# Patient Record
Sex: FEMALE | Race: WHITE | NOT HISPANIC OR LATINO | Employment: STUDENT | URBAN - METROPOLITAN AREA
[De-identification: names, ages, dates, MRNs, and addresses within clinical notes are randomized per-mention and may not be internally consistent; named-entity substitution may affect disease eponyms.]

---

## 2017-01-24 ENCOUNTER — OFFICE VISIT (OUTPATIENT)
Dept: PEDIATRICS | Facility: CLINIC | Age: 22
End: 2017-01-24
Payer: MEDICARE

## 2017-01-24 ENCOUNTER — TELEPHONE (OUTPATIENT)
Dept: PEDIATRICS | Facility: CLINIC | Age: 22
End: 2017-01-24

## 2017-01-24 ENCOUNTER — LAB VISIT (OUTPATIENT)
Dept: LAB | Facility: HOSPITAL | Age: 22
End: 2017-01-24
Attending: PEDIATRICS
Payer: MEDICARE

## 2017-01-24 VITALS
WEIGHT: 129.63 LBS | HEART RATE: 118 BPM | SYSTOLIC BLOOD PRESSURE: 109 MMHG | TEMPERATURE: 99 F | BODY MASS INDEX: 19.83 KG/M2 | DIASTOLIC BLOOD PRESSURE: 81 MMHG | RESPIRATION RATE: 18 BRPM

## 2017-01-24 DIAGNOSIS — R59.0 LYMPHADENOPATHY OF RIGHT CERVICAL REGION: Primary | ICD-10-CM

## 2017-01-24 DIAGNOSIS — R22.0 FACIAL SWELLING: ICD-10-CM

## 2017-01-24 DIAGNOSIS — R59.0 LYMPHADENOPATHY OF RIGHT CERVICAL REGION: ICD-10-CM

## 2017-01-24 LAB
ALBUMIN SERPL BCP-MCNC: 3.7 G/DL
ALP SERPL-CCNC: 159 U/L
ALT SERPL W/O P-5'-P-CCNC: 388 U/L
ANION GAP SERPL CALC-SCNC: 10 MMOL/L
AST SERPL-CCNC: 534 U/L
BASOPHILS # BLD AUTO: 0.09 K/UL
BASOPHILS NFR BLD: 1.8 %
BILIRUB SERPL-MCNC: 1 MG/DL
BUN SERPL-MCNC: 8 MG/DL
CALCIUM SERPL-MCNC: 8.7 MG/DL
CHLORIDE SERPL-SCNC: 99 MMOL/L
CO2 SERPL-SCNC: 26 MMOL/L
CREAT SERPL-MCNC: 0.8 MG/DL
CRP SERPL-MCNC: 19.5 MG/L
DIFFERENTIAL METHOD: ABNORMAL
EOSINOPHIL # BLD AUTO: 0 K/UL
EOSINOPHIL NFR BLD: 0 %
ERYTHROCYTE [DISTWIDTH] IN BLOOD BY AUTOMATED COUNT: 14.3 %
EST. GFR  (AFRICAN AMERICAN): >60 ML/MIN/1.73 M^2
EST. GFR  (NON AFRICAN AMERICAN): >60 ML/MIN/1.73 M^2
GLUCOSE SERPL-MCNC: 93 MG/DL
HCT VFR BLD AUTO: 41.2 %
HETEROPH AB SERPL QL IA: NEGATIVE
HGB BLD-MCNC: 13.5 G/DL
LYMPHOCYTES # BLD AUTO: 2.5 K/UL
LYMPHOCYTES NFR BLD: 50 %
MCH RBC QN AUTO: 29.3 PG
MCHC RBC AUTO-ENTMCNC: 32.8 %
MCV RBC AUTO: 89 FL
MONOCYTES # BLD AUTO: 0.4 K/UL
MONOCYTES NFR BLD: 8.2 %
NEUTROPHILS # BLD AUTO: 2 K/UL
NEUTROPHILS NFR BLD: 39.8 %
PLATELET # BLD AUTO: 187 K/UL
PMV BLD AUTO: 11.1 FL
POTASSIUM SERPL-SCNC: 3.6 MMOL/L
PROT SERPL-MCNC: 7.3 G/DL
RBC # BLD AUTO: 4.61 M/UL
SODIUM SERPL-SCNC: 135 MMOL/L
WBC # BLD AUTO: 5.02 K/UL

## 2017-01-24 PROCEDURE — 99999 PR PBB SHADOW E&M-EST. PATIENT-LVL III: CPT | Mod: PBBFAC,,, | Performed by: PEDIATRICS

## 2017-01-24 PROCEDURE — 86308 HETEROPHILE ANTIBODY SCREEN: CPT

## 2017-01-24 PROCEDURE — 86665 EPSTEIN-BARR CAPSID VCA: CPT

## 2017-01-24 PROCEDURE — 86580 TB INTRADERMAL TEST: CPT | Mod: S$GLB,,, | Performed by: PEDIATRICS

## 2017-01-24 PROCEDURE — 36415 COLL VENOUS BLD VENIPUNCTURE: CPT | Mod: PO

## 2017-01-24 PROCEDURE — 85025 COMPLETE CBC W/AUTO DIFF WBC: CPT

## 2017-01-24 PROCEDURE — 96372 THER/PROPH/DIAG INJ SC/IM: CPT | Mod: 59,S$GLB,, | Performed by: PEDIATRICS

## 2017-01-24 PROCEDURE — 99214 OFFICE O/P EST MOD 30 MIN: CPT | Mod: 25,S$GLB,, | Performed by: PEDIATRICS

## 2017-01-24 PROCEDURE — 80053 COMPREHEN METABOLIC PANEL: CPT

## 2017-01-24 PROCEDURE — 86140 C-REACTIVE PROTEIN: CPT

## 2017-01-24 PROCEDURE — 1159F MED LIST DOCD IN RCRD: CPT | Mod: S$GLB,,, | Performed by: PEDIATRICS

## 2017-01-24 RX ORDER — DEXAMETHASONE SODIUM PHOSPHATE 4 MG/ML
8 INJECTION, SOLUTION INTRA-ARTICULAR; INTRALESIONAL; INTRAMUSCULAR; INTRAVENOUS; SOFT TISSUE
Status: COMPLETED | OUTPATIENT
Start: 2017-01-24 | End: 2017-01-24

## 2017-01-24 RX ADMIN — DEXAMETHASONE SODIUM PHOSPHATE 8 MG: 4 INJECTION, SOLUTION INTRA-ARTICULAR; INTRALESIONAL; INTRAMUSCULAR; INTRAVENOUS; SOFT TISSUE at 03:01

## 2017-01-24 NOTE — TELEPHONE ENCOUNTER
Returned call. Spoke with mom. Mom stating that patient was seen by ENT last week. Started on Clindamycin and a steroid. Mom stating that patient has worsened. Stating that patient has facial swelling/ sore throat/ hoarseness/ swollen gland in neck. Stating that patient needs to be seen today. Appointment scheduled today with Dr Valencia.

## 2017-01-24 NOTE — TELEPHONE ENCOUNTER
----- Message from Jose Hernandez sent at 1/24/2017 10:16 AM CST -----  Contact: Patient  Patient's mom called Samia requesting same day appointment. Please call back at [phone]. Thanks,

## 2017-01-24 NOTE — PROGRESS NOTES
History of Present Illness     Patient Identification  Dianna Gee is a 21 y.o. female.    Patient information was obtained from patient.  History/Exam limitations: none.  Chief Complaint   Face swelling, enlarged lymph node right side, fever 99.2 here, sweating since Wednesday x 6 days ago.     The patient complains of headache, facial swelling, right sided neck pain.  Roommate cooked salmon and thought allergic reaction.  ENT office, given antibiotics orally and oral steroid.  No testing for strep.  Clindamycin orally and steroid. Onset of symptoms was gradual starting 6 days ago and has been unchanged since that time except right lymph node is a little smaller. The patient had been seen by another provider for this illness. Other symptoms include headache.  No vomiting, nausea. The patient had not had a known Strep exposure. The patient has not had a known Mono exposure.  No cat or kitten exposure.   No past medical history on file.  No family history on file.  Current Outpatient Prescriptions   Medication Sig Dispense Refill    DEXTROMETHORPHAN HBR (DELSYM ORAL) Take by mouth.      GUAIFENESIN (MUCINEX ORAL) Take by mouth.      LACTOBACILLUS ACIDOPHILUS (PROBIOTIC ORAL) Take by mouth.       Current Facility-Administered Medications   Medication Dose Route Frequency Provider Last Rate Last Dose    dexamethasone injection 8 mg  8 mg Intramuscular 1 time in Clinic/HOD Joselin Valencia MD         Review of patient's allergies indicates:   Allergen Reactions    Fish containing products     Rondec [brompheniramine-pseudoephedrin]      Problems with gait--dizzy spells--drowsy     Social History     Social History    Marital status: Single     Spouse name: N/A    Number of children: N/A    Years of education: N/A     Occupational History    Not on file.     Social History Main Topics    Smoking status: Never Smoker    Smokeless tobacco: Not on file    Alcohol use Not on file    Drug use: Not on  file    Sexual activity: Not on file     Other Topics Concern    Not on file     Social History Narrative    No narrative on file     Review of Systems  Pertinent items are noted in HPI.     Physical Exam     There were no vitals taken for this visit.    Visit Vitals    /81    Pulse (!) 118    Temp 99.2 °F (37.3 °C) (Oral)    Resp 18    Wt 58.8 kg (129 lb 10.1 oz)    LMP 01/13/2017 (Exact Date)    BMI 19.83 kg/m2       General Appearance:    Alert, cooperative, no distress, appears stated age   Head:    Normocephalic, without obvious abnormality, atraumatic   Eyes:    PERRL, conjunctiva/corneas clear, EOM's intact, fundi     benign, both eyes   Ears:    Normal TM's and external ear canals, both ears   Nose:   Nares normal, septum midline, mucosa normal, no drainage    or sinus tenderness       Neck:   Supple, symmetrical, right anterior cervical lymphadenopathy large noted, nonflluctuant firm, tender, mobile.          Lungs:     Clear to auscultation bilaterally, respirations unlabored        Heart:    Regular rate and rhythm, S1 and S2 normal, no murmur, rub   or gallop       Abdomen:     Soft, non-tender, bowel sounds active all four quadrants,     no masses, no organomegaly           Extremities:   Extremities normal, atraumatic, no cyanosis or edema   Pulses:   2+ and symmetric all extremities   Skin:   Skin color, texture, turgor normal, no rashes or lesions   Lymph nodes:   Cervical, supraclavicular, and axillary nodes normal   Neurologic:   CNII-XII intact, normal strength, sensation and reflexes     throughout       Clindamycin 150 mg tid   Prednisone oral steroid    IMPRESSION;    RIGHT CERVICAL LYPHADENOPATHY  MALAISE/FATIGUE  FEVER/CHILLS  FACIAL SWELLING    PLAN:  BLOODWORK TODAY.    IM dexamethasone given 8 mg   Continue clindamycin as directed.  Will contact outpatient with results of bloodwork (mono, liver function, tuberculin skin test, CBC, adenovirus stool).

## 2017-01-25 DIAGNOSIS — R53.83 FATIGUE, UNSPECIFIED TYPE: ICD-10-CM

## 2017-01-25 DIAGNOSIS — R59.0 CERVICAL LYMPHADENOPATHY: Primary | ICD-10-CM

## 2017-01-27 ENCOUNTER — CLINICAL SUPPORT (OUTPATIENT)
Dept: PEDIATRICS | Facility: CLINIC | Age: 22
End: 2017-01-27
Payer: COMMERCIAL

## 2017-01-27 ENCOUNTER — LAB VISIT (OUTPATIENT)
Dept: LAB | Facility: HOSPITAL | Age: 22
End: 2017-01-27
Attending: PEDIATRICS
Payer: COMMERCIAL

## 2017-01-27 DIAGNOSIS — R53.83 FATIGUE, UNSPECIFIED TYPE: ICD-10-CM

## 2017-01-27 DIAGNOSIS — R59.0 CERVICAL LYMPHADENOPATHY: ICD-10-CM

## 2017-01-27 PROCEDURE — 87301 ADENOVIRUS AG IA: CPT

## 2017-01-30 ENCOUNTER — TELEPHONE (OUTPATIENT)
Dept: PEDIATRICS | Facility: CLINIC | Age: 22
End: 2017-01-30

## 2017-01-30 ENCOUNTER — LAB VISIT (OUTPATIENT)
Dept: LAB | Facility: HOSPITAL | Age: 22
End: 2017-01-30
Attending: PEDIATRICS
Payer: MEDICARE

## 2017-01-30 DIAGNOSIS — R59.0 CERVICAL LYMPHADENOPATHY: Primary | ICD-10-CM

## 2017-01-30 DIAGNOSIS — R59.0 CERVICAL LYMPHADENOPATHY: ICD-10-CM

## 2017-01-30 LAB
ALBUMIN SERPL BCP-MCNC: 3.5 G/DL
ALP SERPL-CCNC: 284 U/L
ALT SERPL W/O P-5'-P-CCNC: 283 U/L
ANION GAP SERPL CALC-SCNC: 13 MMOL/L
AST SERPL-CCNC: 146 U/L
BASOPHILS # BLD AUTO: ABNORMAL K/UL
BASOPHILS NFR BLD: 0 %
BILIRUB SERPL-MCNC: 0.8 MG/DL
BUN SERPL-MCNC: 8 MG/DL
CALCIUM SERPL-MCNC: 9.2 MG/DL
CHLORIDE SERPL-SCNC: 99 MMOL/L
CO2 SERPL-SCNC: 29 MMOL/L
CREAT SERPL-MCNC: 0.7 MG/DL
DIFFERENTIAL METHOD: ABNORMAL
EBV EA IGG SER QL IF: ABNORMAL TITER
EBV NA IGG SER IA-ACNC: ABNORMAL TITER
EBV VCA IGG SER IA-ACNC: ABNORMAL TITER
EBV VCA IGM SER IA-ACNC: ABNORMAL TITER
EOSINOPHIL # BLD AUTO: ABNORMAL K/UL
EOSINOPHIL NFR BLD: 0 %
ERYTHROCYTE [DISTWIDTH] IN BLOOD BY AUTOMATED COUNT: 14.9 %
EST. GFR  (AFRICAN AMERICAN): >60 ML/MIN/1.73 M^2
EST. GFR  (NON AFRICAN AMERICAN): >60 ML/MIN/1.73 M^2
GLUCOSE SERPL-MCNC: 108 MG/DL
HCT VFR BLD AUTO: 39 %
HGB BLD-MCNC: 13.3 G/DL
LYMPHOCYTES # BLD AUTO: ABNORMAL K/UL
LYMPHOCYTES NFR BLD: 58 %
MCH RBC QN AUTO: 29.2 PG
MCHC RBC AUTO-ENTMCNC: 34.1 %
MCV RBC AUTO: 86 FL
MONOCYTES # BLD AUTO: ABNORMAL K/UL
MONOCYTES NFR BLD: 5 %
NEUTROPHILS NFR BLD: 34 %
NEUTS BAND NFR BLD MANUAL: 3 %
PLATELET # BLD AUTO: 246 K/UL
PLATELET BLD QL SMEAR: ABNORMAL
PMV BLD AUTO: 10.4 FL
POTASSIUM SERPL-SCNC: 3.9 MMOL/L
PROT SERPL-MCNC: 7.3 G/DL
RBC # BLD AUTO: 4.56 M/UL
SODIUM SERPL-SCNC: 141 MMOL/L
WBC # BLD AUTO: 8.51 K/UL

## 2017-01-30 PROCEDURE — 85007 BL SMEAR W/DIFF WBC COUNT: CPT | Mod: PO

## 2017-01-30 PROCEDURE — 36415 COLL VENOUS BLD VENIPUNCTURE: CPT | Mod: PO

## 2017-01-30 PROCEDURE — 80053 COMPREHEN METABOLIC PANEL: CPT | Mod: PO

## 2017-01-30 PROCEDURE — 85027 COMPLETE CBC AUTOMATED: CPT | Mod: PO

## 2017-01-30 NOTE — TELEPHONE ENCOUNTER
Patient was in the office on 1/24/17 seen by Dr Valencia. Mom is asking for lab results. Please advise.

## 2017-01-30 NOTE — TELEPHONE ENCOUNTER
Looks like probable mono, will recheck liver enzymes and follow up next week.  Discussed with mom, PLEASE PUT ON MY SCHEDULE FEB 7 AT 2PM.

## 2017-01-30 NOTE — TELEPHONE ENCOUNTER
----- Message from Katie Crane sent at 1/30/2017  8:20 AM CST -----  Contact: Samia Hamlin called regarding lab results from last week. Please contact [phone] (spwy)

## 2017-01-30 NOTE — TELEPHONE ENCOUNTER
----- Message from Kenia Suggs MD sent at 1/30/2017 12:48 PM CST -----  Please call with mono titers +- no contact sports or any activity that can injure her abdomen due to increase risk of spleen getting big with this virus- please let me know if she has questions.

## 2017-02-01 LAB — HADV AG STL QL IA: NOT DETECTED

## 2017-02-07 ENCOUNTER — OFFICE VISIT (OUTPATIENT)
Dept: PEDIATRICS | Facility: CLINIC | Age: 22
End: 2017-02-07
Payer: COMMERCIAL

## 2017-02-07 VITALS
SYSTOLIC BLOOD PRESSURE: 118 MMHG | RESPIRATION RATE: 18 BRPM | BODY MASS INDEX: 18.32 KG/M2 | DIASTOLIC BLOOD PRESSURE: 78 MMHG | WEIGHT: 123.69 LBS | TEMPERATURE: 98 F | HEIGHT: 69 IN | HEART RATE: 100 BPM

## 2017-02-07 DIAGNOSIS — B27.90 MONONUCLEOSIS SYNDROME: Primary | ICD-10-CM

## 2017-02-07 PROCEDURE — 99999 PR PBB SHADOW E&M-EST. PATIENT-LVL III: CPT | Mod: PBBFAC,,, | Performed by: PEDIATRICS

## 2017-02-07 PROCEDURE — 99213 OFFICE O/P EST LOW 20 MIN: CPT | Mod: S$GLB,,, | Performed by: PEDIATRICS

## 2017-02-07 RX ORDER — FLUOCINONIDE 0.5 MG/G
OINTMENT TOPICAL
Refills: 1 | COMMUNITY
Start: 2016-11-16

## 2017-02-07 RX ORDER — CLINDAMYCIN HYDROCHLORIDE 150 MG/1
CAPSULE ORAL
Refills: 0 | COMMUNITY
Start: 2017-01-20 | End: 2017-02-07

## 2017-02-07 RX ORDER — DIPHENHYDRAMINE HCL 25 MG
50 TABLET ORAL
COMMUNITY
End: 2017-02-07

## 2017-02-07 RX ORDER — METHYLPREDNISOLONE 4 MG/1
TABLET ORAL
Refills: 0 | COMMUNITY
Start: 2017-01-20 | End: 2017-02-07

## 2017-02-07 NOTE — PROGRESS NOTES
Subjective:      History was provided by the mother and patient was brought in for Follow-up (f/u from last MD visit for mono)  .    History of Present Illness:  HPI  Pt here for follow up of mononucleosis syndrome with mildly positive igg and igm.  Pt also seemed to have significant swelling with antibiotic. Improved with stopping antibiotic and then recurred after restarting.  No fevers.  Still with mild neck tenderness bilaterally and some mild fatigue with walking long distances.  No sob with activity otherwise.  Reviewed labs from last 2 previous visits with patient.  She does have occasional  Cough, 1-2 times per day, seems to be related to some post nasal drip.      Review of Systems   Constitutional: Negative for activity change, appetite change, chills, fatigue and fever.   HENT: Negative for congestion, ear discharge, ear pain, nosebleeds, postnasal drip, rhinorrhea, sinus pressure, sneezing and sore throat.    Eyes: Negative for pain, discharge, redness and itching.   Respiratory: Positive for cough. Negative for shortness of breath and wheezing.    Cardiovascular: Negative for chest pain and palpitations.   Gastrointestinal: Negative for abdominal pain, constipation, diarrhea and vomiting.   Genitourinary: Negative for dysuria, enuresis, hematuria and urgency.   Musculoskeletal: Negative for neck stiffness.   Skin: Negative for rash.   Neurological: Negative for syncope and headaches.       Objective:     Physical Exam   Constitutional: She is oriented to person, place, and time. She appears well-developed and well-nourished.   HENT:   Head: Normocephalic and atraumatic.   Right Ear: Tympanic membrane, external ear and ear canal normal.   Left Ear: Tympanic membrane, external ear and ear canal normal.   Mild nasal discharge   Eyes: Conjunctivae are normal. Pupils are equal, round, and reactive to light.   Neck: Normal range of motion. Neck supple.   Cardiovascular: Normal rate, regular rhythm and normal  heart sounds.    No murmur heard.  Pulmonary/Chest: Effort normal and breath sounds normal. No respiratory distress.   Abdominal: Bowel sounds are normal. She exhibits no distension and no mass. There is no tenderness.   Lymphadenopathy:     She has cervical adenopathy (bilateral anterior cervical adenopathy).   Neurological: She is alert and oriented to person, place, and time.   Skin: Skin is warm. No rash noted.   Nursing note and vitals reviewed.      Assessment:        1. Mononucleosis syndrome       improved  Plan:       Dianna was seen today for follow-up.    Diagnoses and all orders for this visit:    Mononucleosis syndrome  -     Comprehensive metabolic panel; Future  -     CBC auto differential; Future      Labs in 3-4 wks  Call or return prn other concerns.  ]

## 2017-03-01 ENCOUNTER — TELEPHONE (OUTPATIENT)
Dept: PEDIATRICS | Facility: CLINIC | Age: 22
End: 2017-03-01

## 2017-03-01 NOTE — TELEPHONE ENCOUNTER
----- Message from Annika Thornton sent at 3/1/2017  3:04 PM CST -----  Patient states that she need her shot records.  Please call when ready for , call [phone].

## 2017-03-01 NOTE — TELEPHONE ENCOUNTER
Returned call. Spoke with patient. Told patient that she requires another menactra or meningococcal vaccine. Told patient that most colleges require two. Patient stating that she does not believe that she needs to be up to date and is applying for a job. Immunization record left up front as requested

## 2017-03-31 DIAGNOSIS — Z20.828 EXPOSURE TO THE FLU: Primary | ICD-10-CM

## 2017-03-31 RX ORDER — OSELTAMIVIR PHOSPHATE 75 MG/1
75 CAPSULE ORAL DAILY
Qty: 10 CAPSULE | Refills: 0 | Status: SHIPPED | OUTPATIENT
Start: 2017-03-31 | End: 2017-03-31 | Stop reason: SDUPTHER

## 2017-03-31 RX ORDER — OSELTAMIVIR PHOSPHATE 75 MG/1
75 CAPSULE ORAL DAILY
Qty: 10 CAPSULE | Refills: 0 | Status: SHIPPED | OUTPATIENT
Start: 2017-03-31 | End: 2017-04-10

## 2017-04-17 ENCOUNTER — TELEPHONE (OUTPATIENT)
Dept: PEDIATRICS | Facility: CLINIC | Age: 22
End: 2017-04-17

## 2017-04-17 NOTE — TELEPHONE ENCOUNTER
----- Message from Michael Farmer sent at 4/17/2017  2:20 PM CDT -----  Pt called asking if she can get a school excuse for and allergic reaction to some fish/pls call back at [phone]

## 2017-04-17 NOTE — TELEPHONE ENCOUNTER
Returned call. Spoke with patient. Patient asked for a school excuse. Patient had to air apartment after having reaction to fish. Note left up front for

## 2017-07-14 ENCOUNTER — TELEPHONE (OUTPATIENT)
Dept: PEDIATRICS | Facility: CLINIC | Age: 22
End: 2017-07-14

## 2017-07-14 NOTE — TELEPHONE ENCOUNTER
----- Message from Beatriz Nettles sent at 7/14/2017 11:41 AM CDT -----  Contact: self   Patient need proof she got a TB shot from your office want to know if she can pick it up in person if so please call patient back at [phone]

## 2017-08-14 ENCOUNTER — OFFICE VISIT (OUTPATIENT)
Dept: PEDIATRICS | Facility: CLINIC | Age: 22
End: 2017-08-14
Payer: COMMERCIAL

## 2017-08-14 VITALS
RESPIRATION RATE: 18 BRPM | TEMPERATURE: 99 F | DIASTOLIC BLOOD PRESSURE: 65 MMHG | HEART RATE: 71 BPM | WEIGHT: 130.5 LBS | BODY MASS INDEX: 19.78 KG/M2 | HEIGHT: 68 IN | SYSTOLIC BLOOD PRESSURE: 105 MMHG

## 2017-08-14 DIAGNOSIS — J06.9 URI, ACUTE: Primary | ICD-10-CM

## 2017-08-14 PROCEDURE — 99213 OFFICE O/P EST LOW 20 MIN: CPT | Mod: S$GLB,,, | Performed by: PEDIATRICS

## 2017-08-14 PROCEDURE — 3008F BODY MASS INDEX DOCD: CPT | Mod: S$GLB,,, | Performed by: PEDIATRICS

## 2017-08-14 PROCEDURE — 99999 PR PBB SHADOW E&M-EST. PATIENT-LVL III: CPT | Mod: PBBFAC,,, | Performed by: PEDIATRICS

## 2017-08-14 RX ORDER — DIPHENHYDRAMINE HCL 25 MG
50 TABLET ORAL
COMMUNITY
End: 2017-08-14

## 2017-08-14 RX ORDER — CETIRIZINE HYDROCHLORIDE, PSEUDOEPHEDRINE HYDROCHLORIDE 5; 120 MG/1; MG/1
1 TABLET, FILM COATED, EXTENDED RELEASE ORAL 2 TIMES DAILY
Qty: 20 TABLET | Refills: 0 | Status: SHIPPED | OUTPATIENT
Start: 2017-08-14 | End: 2017-08-24

## 2017-08-14 NOTE — PROGRESS NOTES
"Subjective:      Dianna Gee is a 22 y.o. female here with patient. Patient brought in for Hoarse (symptoms started about 3 days ago; went to urgent care on Sat 8/12 for symptoms); Sore Throat; Nasal Congestion (getting worse in the last couple days); and Headache (mostly sinus headache per patient)      History of Present Illness:  Seen at urgent care 2 days ago and treated with "cortisone shot" per patient.  Not improving. No fever.  Had pneumonia this time last year.        URI    This is a new problem. The current episode started in the past 7 days (3 days ago). The problem has been unchanged. There has been no fever. Associated symptoms include congestion, coughing and rhinorrhea. Pertinent negatives include no abdominal pain, chest pain, diarrhea, dysuria, ear pain, headaches, nausea, rash, sneezing, sore throat, vomiting or wheezing. She has tried decongestant for the symptoms. The treatment provided mild relief.       Review of Systems   Constitutional: Negative for activity change, appetite change, chills, fatigue and fever.   HENT: Positive for congestion and rhinorrhea. Negative for ear discharge, ear pain, nosebleeds, postnasal drip, sinus pressure, sneezing and sore throat.    Eyes: Negative for pain, discharge, redness and itching.   Respiratory: Positive for cough. Negative for shortness of breath and wheezing.    Cardiovascular: Negative for chest pain and palpitations.   Gastrointestinal: Negative for abdominal pain, constipation, diarrhea, nausea and vomiting.   Genitourinary: Negative for dysuria, enuresis, hematuria and urgency.   Musculoskeletal: Negative for neck stiffness.   Skin: Negative for rash.   Neurological: Negative for syncope and headaches.       Objective:     Physical Exam   Constitutional: She is oriented to person, place, and time. She appears well-developed and well-nourished.   HENT:   Head: Normocephalic and atraumatic.   Right Ear: Tympanic membrane, external ear and ear " canal normal.   Left Ear: Tympanic membrane, external ear and ear canal normal.   Nose: Mucosal edema and rhinorrhea (clear rhinorrhea) present.   Mouth/Throat: Mucous membranes are normal. Posterior oropharyngeal erythema (mild) present. No oropharyngeal exudate or posterior oropharyngeal edema.   Eyes: Conjunctivae are normal. Pupils are equal, round, and reactive to light.   Neck: Normal range of motion. Neck supple.   Cardiovascular: Normal rate, regular rhythm and normal heart sounds.    No murmur heard.  Pulmonary/Chest: Effort normal and breath sounds normal. No respiratory distress.   Abdominal: Bowel sounds are normal. She exhibits no distension and no mass. There is no tenderness.   Neurological: She is alert and oriented to person, place, and time.   Skin: Skin is warm. No rash noted.   Nursing note and vitals reviewed.      Assessment:        1. URI, acute         Plan:       Dianna was seen today for hoarse, sore throat, nasal congestion and headache.    Diagnoses and all orders for this visit:    URI, acute  -     cetirizine-pseudoephedrine 5-120 mg Tb12; Take 1 tablet by mouth 2 (two) times daily.      1.  Nasal saline spray as needed  for congestion.  2.  Encourage frequent oral fluids.  3. Decongestant/antihistamine as prescribed.  4.  Return to clinic if lethargy, breathing difficulty, worsening headache/pain, signs of dehydration or if any other acute concerns, but if after hours, call the service or seek evaluation at the Emergency Room.  5.  Return to clinic or call if continued symptoms for 5 days.

## 2022-01-08 ENCOUNTER — IMMUNIZATION (OUTPATIENT)
Dept: FAMILY MEDICINE | Facility: CLINIC | Age: 27
End: 2022-01-08
Payer: COMMERCIAL

## 2022-01-08 DIAGNOSIS — Z23 NEED FOR VACCINATION: Primary | ICD-10-CM

## 2022-01-08 PROCEDURE — 0004A COVID-19, MRNA, LNP-S, PF, 30 MCG/0.3 ML DOSE VACCINE: CPT | Mod: PBBFAC | Performed by: FAMILY MEDICINE

## 2022-03-03 ENCOUNTER — OFFICE VISIT (OUTPATIENT)
Dept: URGENT CARE | Facility: CLINIC | Age: 27
End: 2022-03-03
Payer: COMMERCIAL

## 2022-03-03 VITALS
SYSTOLIC BLOOD PRESSURE: 128 MMHG | BODY MASS INDEX: 19.7 KG/M2 | RESPIRATION RATE: 18 BRPM | HEIGHT: 68 IN | TEMPERATURE: 98 F | OXYGEN SATURATION: 98 % | DIASTOLIC BLOOD PRESSURE: 88 MMHG | HEART RATE: 74 BPM | WEIGHT: 130 LBS

## 2022-03-03 DIAGNOSIS — J06.9 VIRAL URI WITH COUGH: Primary | ICD-10-CM

## 2022-03-03 DIAGNOSIS — R52 GENERALIZED BODY ACHES: ICD-10-CM

## 2022-03-03 DIAGNOSIS — R09.81 CONGESTION OF NASAL SINUS: Primary | ICD-10-CM

## 2022-03-03 DIAGNOSIS — R05.9 COUGH: ICD-10-CM

## 2022-03-03 DIAGNOSIS — R50.9 FEVER, UNSPECIFIED FEVER CAUSE: ICD-10-CM

## 2022-03-03 DIAGNOSIS — Z87.01 HISTORY OF PNEUMONIA: ICD-10-CM

## 2022-03-03 LAB
CTP QC/QA: YES
POC MOLECULAR INFLUENZA A AGN: NEGATIVE
POC MOLECULAR INFLUENZA B AGN: NEGATIVE

## 2022-03-03 PROCEDURE — 99213 PR OFFICE/OUTPT VISIT, EST, LEVL III, 20-29 MIN: ICD-10-PCS | Mod: S$GLB,,, | Performed by: NURSE PRACTITIONER

## 2022-03-03 PROCEDURE — 71046 XR CHEST PA AND LATERAL: ICD-10-PCS | Mod: S$GLB,,, | Performed by: RADIOLOGY

## 2022-03-03 PROCEDURE — 1160F RVW MEDS BY RX/DR IN RCRD: CPT | Mod: CPTII,S$GLB,, | Performed by: NURSE PRACTITIONER

## 2022-03-03 PROCEDURE — 1159F PR MEDICATION LIST DOCUMENTED IN MEDICAL RECORD: ICD-10-PCS | Mod: CPTII,S$GLB,, | Performed by: NURSE PRACTITIONER

## 2022-03-03 PROCEDURE — 3079F PR MOST RECENT DIASTOLIC BLOOD PRESSURE 80-89 MM HG: ICD-10-PCS | Mod: CPTII,S$GLB,, | Performed by: NURSE PRACTITIONER

## 2022-03-03 PROCEDURE — 71046 X-RAY EXAM CHEST 2 VIEWS: CPT | Mod: S$GLB,,, | Performed by: RADIOLOGY

## 2022-03-03 PROCEDURE — 1160F PR REVIEW ALL MEDS BY PRESCRIBER/CLIN PHARMACIST DOCUMENTED: ICD-10-PCS | Mod: CPTII,S$GLB,, | Performed by: NURSE PRACTITIONER

## 2022-03-03 PROCEDURE — 3074F PR MOST RECENT SYSTOLIC BLOOD PRESSURE < 130 MM HG: ICD-10-PCS | Mod: CPTII,S$GLB,, | Performed by: NURSE PRACTITIONER

## 2022-03-03 PROCEDURE — 99213 OFFICE O/P EST LOW 20 MIN: CPT | Mod: S$GLB,,, | Performed by: NURSE PRACTITIONER

## 2022-03-03 PROCEDURE — 3008F BODY MASS INDEX DOCD: CPT | Mod: CPTII,S$GLB,, | Performed by: NURSE PRACTITIONER

## 2022-03-03 PROCEDURE — 87502 INFLUENZA DNA AMP PROBE: CPT | Mod: QW,S$GLB,, | Performed by: NURSE PRACTITIONER

## 2022-03-03 PROCEDURE — 87502 POCT INFLUENZA A/B MOLECULAR: ICD-10-PCS | Mod: QW,S$GLB,, | Performed by: NURSE PRACTITIONER

## 2022-03-03 PROCEDURE — 1159F MED LIST DOCD IN RCRD: CPT | Mod: CPTII,S$GLB,, | Performed by: NURSE PRACTITIONER

## 2022-03-03 PROCEDURE — 3079F DIAST BP 80-89 MM HG: CPT | Mod: CPTII,S$GLB,, | Performed by: NURSE PRACTITIONER

## 2022-03-03 PROCEDURE — 3008F PR BODY MASS INDEX (BMI) DOCUMENTED: ICD-10-PCS | Mod: CPTII,S$GLB,, | Performed by: NURSE PRACTITIONER

## 2022-03-03 PROCEDURE — 3074F SYST BP LT 130 MM HG: CPT | Mod: CPTII,S$GLB,, | Performed by: NURSE PRACTITIONER

## 2022-03-03 RX ORDER — PROMETHAZINE HYDROCHLORIDE AND DEXTROMETHORPHAN HYDROBROMIDE 6.25; 15 MG/5ML; MG/5ML
5 SYRUP ORAL NIGHTLY PRN
Qty: 120 ML | Refills: 0 | Status: SHIPPED | OUTPATIENT
Start: 2022-03-03

## 2022-03-03 NOTE — PROGRESS NOTES
"Subjective:       Patient ID: Dianna Gee is a 26 y.o. female.    Vitals:  height is 5' 7.9" (1.725 m) and weight is 59 kg (130 lb). Her temperature is 97.9 °F (36.6 °C). Her blood pressure is 128/88 and her pulse is 74. Her respiration is 18 and oxygen saturation is 98%.     Chief Complaint: Sinus Problem, Fever, and Cough    Pt states she took 3 covid test all negative she wants to talk with the provider before any testing     Sinus Problem  This is a new problem. The current episode started in the past 7 days. The problem is unchanged. The maximum temperature recorded prior to her arrival was 100.4 - 100.9 F. Her pain is at a severity of 2/10. The pain is mild. Associated symptoms include coughing, headaches, sinus pressure, sneezing and a sore throat. Pertinent negatives include no ear pain, shortness of breath or swollen glands. Past treatments include acetaminophen. The treatment provided mild relief.   Fever   Associated symptoms include coughing, headaches and a sore throat. Pertinent negatives include no ear pain.   Cough  Associated symptoms include a fever, headaches and a sore throat. Pertinent negatives include no ear pain or shortness of breath.       Constitution: Positive for fever.   HENT: Positive for sinus pressure and sore throat. Negative for ear pain.    Respiratory: Positive for cough. Negative for shortness of breath.    Allergic/Immunologic: Positive for sneezing.   Neurological: Positive for headaches.       Objective:      Physical Exam   Constitutional: She is oriented to person, place, and time. She appears well-developed. She is cooperative.  Non-toxic appearance. She does not appear ill. No distress.      Comments:afebrile   HENT:   Head: Normocephalic and atraumatic.   Ears:   Right Ear: Hearing, tympanic membrane, external ear and ear canal normal.   Left Ear: Hearing, tympanic membrane, external ear and ear canal normal.   Nose: Nose normal. No mucosal edema, rhinorrhea " or nasal deformity. No epistaxis. Right sinus exhibits no maxillary sinus tenderness and no frontal sinus tenderness. Left sinus exhibits no maxillary sinus tenderness and no frontal sinus tenderness.   Mouth/Throat: Uvula is midline, oropharynx is clear and moist and mucous membranes are normal. No trismus in the jaw. Normal dentition. No uvula swelling. No posterior oropharyngeal erythema.   Mild sinus congesion      Comments: Mild sinus congesion  Eyes: Conjunctivae and lids are normal. Right eye exhibits no discharge. Left eye exhibits no discharge. No scleral icterus.   Neck: Trachea normal and phonation normal. Neck supple.   Cardiovascular: Normal rate, regular rhythm, normal heart sounds and normal pulses.   Pulmonary/Chest: Effort normal and breath sounds normal. No respiratory distress.   Lungs clear bilaterally  O2 98% on RA    Comments: Lungs clear bilaterally  O2 98% on RA    Abdominal: Normal appearance and bowel sounds are normal. She exhibits no distension and no mass. Soft. There is no abdominal tenderness.   Musculoskeletal: Normal range of motion.         General: No deformity. Normal range of motion.   Neurological: She is alert and oriented to person, place, and time. She exhibits normal muscle tone. Coordination normal.   Skin: Skin is warm, dry, intact, not diaphoretic and not pale.   Psychiatric: Her speech is normal and behavior is normal. Judgment and thought content normal.   Nursing note and vitals reviewed.        Results for orders placed or performed in visit on 03/03/22   POCT Influenza A/B MOLECULAR   Result Value Ref Range    POC Molecular Influenza A Ag Negative Negative, Not Reported    POC Molecular Influenza B Ag Negative Negative, Not Reported     Acceptable Yes      X-Ray Chest PA And Lateral    Result Date: 3/3/2022  EXAMINATION: XR CHEST PA AND LATERAL CLINICAL HISTORY: Personal history of pneumonia (recurrent) FINDINGS: Chest two views: Heart size is normal.   Lungs are clear.  The bones showed DJD.     No acute process seen. Electronically signed by: Tony Hansen MD Date:    03/03/2022 Time:    11:46  Assessment:       1. Viral URI with cough    2. Generalized body aches    3. History of pneumonia          Plan:       Patient seen today by primary care.  COVID test pending    Viral URI with cough  -     promethazine-dextromethorphan (PROMETHAZINE-DM) 6.25-15 mg/5 mL Syrp; Take 5 mLs by mouth nightly as needed (cough suppression).  Dispense: 120 mL; Refill: 0    Generalized body aches  -     POCT Influenza A/B MOLECULAR    History of pneumonia  -     X-Ray Chest PA And Lateral; Future; Expected date: 03/03/2022      Patient Instructions   Use an antihistamine such as Claritin, Zyrtec or Allegra to dry you out.     Use pseudoephedrine (behind the counter) to decongest. Pseudoephedrine  30 mg up to 240 mg /day. It can raise your blood pressure and give you palpitations.    Use Flonase 1-2 sprays/nostril per day. It is a local acting steroid nasal spray, if you develop a bloody nose, stop using the medication immediately.    Use warm salt water gargles to ease your throat pain. Hot tea with honey.     Take cough syrup nightly as needed for cough suppression will make drowsy.    Over the counter you can use Tylenol (acetominophen) or Ibuprofen for your minor aches and pains as long as you have no contraindications.    Good nutrition. Lots of rest. Plenty of fluids     You must understand that you've received an Urgent Care treatment only and that you may be released before all your medical problems are known or treated. You, the patient, will arrange for follow up care as instructed.  Follow up with your PCP or specialty clinic as directed in the next 1-2 weeks if not improved or as needed.  You can call [phone] to schedule an appointment with the appropriate provider.  If your condition worsens we recommend that you receive another evaluation at the emergency room  immediately or contact your primary medical clinics after hours call service to discuss your concerns.  Please return here or go to the Emergency Department for any concerns or worsening of condition.

## 2022-03-03 NOTE — PATIENT INSTRUCTIONS
Use an antihistamine such as Claritin, Zyrtec or Allegra to dry you out.     Use pseudoephedrine (behind the counter) to decongest. Pseudoephedrine  30 mg up to 240 mg /day. It can raise your blood pressure and give you palpitations.    Use Flonase 1-2 sprays/nostril per day. It is a local acting steroid nasal spray, if you develop a bloody nose, stop using the medication immediately.    Use warm salt water gargles to ease your throat pain. Hot tea with honey.     Take cough syrup nightly as needed for cough suppression will make drowsy.    Over the counter you can use Tylenol (acetominophen) or Ibuprofen for your minor aches and pains as long as you have no contraindications.    Good nutrition. Lots of rest. Plenty of fluids     You must understand that you've received an Urgent Care treatment only and that you may be released before all your medical problems are known or treated. You, the patient, will arrange for follow up care as instructed.  Follow up with your PCP or specialty clinic as directed in the next 1-2 weeks if not improved or as needed.  You can call [phone] to schedule an appointment with the appropriate provider.  If your condition worsens we recommend that you receive another evaluation at the emergency room immediately or contact your primary medical clinics after hours call service to discuss your concerns.  Please return here or go to the Emergency Department for any concerns or worsening of condition.

## 2022-08-02 DIAGNOSIS — R50.9 FEVER, UNSPECIFIED FEVER CAUSE: Primary | ICD-10-CM

## 2022-08-02 LAB
CTP QC/QA: YES
SARS-COV-2 AG RESP QL IA.RAPID: POSITIVE